# Patient Record
Sex: FEMALE | Race: WHITE | NOT HISPANIC OR LATINO | URBAN - METROPOLITAN AREA
[De-identification: names, ages, dates, MRNs, and addresses within clinical notes are randomized per-mention and may not be internally consistent; named-entity substitution may affect disease eponyms.]

---

## 2017-05-22 ENCOUNTER — APPOINTMENT (RX ONLY)
Dept: URBAN - METROPOLITAN AREA CLINIC 17 | Facility: CLINIC | Age: 67
Setting detail: DERMATOLOGY
End: 2017-05-22

## 2017-05-22 DIAGNOSIS — L94.2 CALCINOSIS CUTIS: ICD-10-CM

## 2017-05-22 DIAGNOSIS — L92.1 NECROBIOSIS LIPOIDICA, NOT ELSEWHERE CLASSIFIED: ICD-10-CM | Status: IMPROVED

## 2017-05-22 PROBLEM — K21.9 GASTRO-ESOPHAGEAL REFLUX DISEASE WITHOUT ESOPHAGITIS: Status: ACTIVE | Noted: 2017-05-22

## 2017-05-22 PROBLEM — J45.909 UNSPECIFIED ASTHMA, UNCOMPLICATED: Status: ACTIVE | Noted: 2017-05-22

## 2017-05-22 PROBLEM — M12.9 ARTHROPATHY, UNSPECIFIED: Status: ACTIVE | Noted: 2017-05-22

## 2017-05-22 PROBLEM — L57.0 ACTINIC KERATOSIS: Status: ACTIVE | Noted: 2017-05-22

## 2017-05-22 PROBLEM — H91.90 UNSPECIFIED HEARING LOSS, UNSPECIFIED EAR: Status: ACTIVE | Noted: 2017-05-22

## 2017-05-22 PROCEDURE — ? PRESCRIPTION

## 2017-05-22 PROCEDURE — 99212 OFFICE O/P EST SF 10 MIN: CPT

## 2017-05-22 PROCEDURE — ? OTHER

## 2017-05-22 PROCEDURE — ? COUNSELING

## 2017-05-22 PROCEDURE — ? TREATMENT REGIMEN

## 2017-05-22 RX ORDER — CLOBETASOL PROPIONATE 0.5 MG/G
1 OINTMENT TOPICAL BID
Qty: 1 | Refills: 2 | Status: ERX | COMMUNITY
Start: 2017-05-22

## 2017-05-22 RX ADMIN — CLOBETASOL PROPIONATE 1: 0.5 OINTMENT TOPICAL at 16:55

## 2017-05-22 ASSESSMENT — LOCATION SIMPLE DESCRIPTION DERM: LOCATION SIMPLE: LEFT PRETIBIAL REGION

## 2017-05-22 ASSESSMENT — LOCATION DETAILED DESCRIPTION DERM: LOCATION DETAILED: LEFT DISTAL PRETIBIAL REGION

## 2017-05-22 ASSESSMENT — LOCATION ZONE DERM: LOCATION ZONE: LEG

## 2017-05-22 NOTE — PROCEDURE: OTHER
Detail Level: Detailed
Other (Free Text): She has a couple areas of calcium within the area of NLD. Improved from last visit. At this point we will just monitor as she is doing well even though she has a little calcium we won't doing anything with it at this point.
Note Text (......Xxx Chief Complaint.): This diagnosis correlates with the

## 2017-05-25 ENCOUNTER — RX ONLY (OUTPATIENT)
Age: 67
Setting detail: RX ONLY
End: 2017-05-25

## 2017-05-25 RX ORDER — BETAMETHASONE DIPROPIONATE 0.5 MG/G
OINTMENT, AUGMENTED TOPICAL
Qty: 1 | Refills: 2 | Status: ERX | COMMUNITY
Start: 2017-05-25

## 2023-05-13 NOTE — PROCEDURE: COUNSELING
Detail Level: Detailed Routing to provider per protocol. apixaban (ELIQUIS) 5 MG Oral Tab  Last refilled on 2/13/23 for #180  with 0 rf.     omeprazole 20 MG Oral Capsule Delayed Release  Last refilled on 9/16/23 for #90  with 0 rf. Last labs 5/2/23. Last seen on 3/6/23. No future appointments. Thank you.